# Patient Record
Sex: MALE | Race: WHITE | NOT HISPANIC OR LATINO | Employment: OTHER | URBAN - METROPOLITAN AREA
[De-identification: names, ages, dates, MRNs, and addresses within clinical notes are randomized per-mention and may not be internally consistent; named-entity substitution may affect disease eponyms.]

---

## 2024-01-17 ENCOUNTER — OFFICE VISIT (OUTPATIENT)
Dept: URGENT CARE | Facility: CLINIC | Age: 74
End: 2024-01-17
Payer: MEDICARE

## 2024-01-17 VITALS
WEIGHT: 210 LBS | OXYGEN SATURATION: 96 % | DIASTOLIC BLOOD PRESSURE: 86 MMHG | HEART RATE: 70 BPM | RESPIRATION RATE: 20 BRPM | BODY MASS INDEX: 31.83 KG/M2 | HEIGHT: 68 IN | SYSTOLIC BLOOD PRESSURE: 130 MMHG | TEMPERATURE: 99 F

## 2024-01-17 DIAGNOSIS — R10.9 ABDOMINAL PAIN, UNSPECIFIED ABDOMINAL LOCATION: Primary | ICD-10-CM

## 2024-01-17 DIAGNOSIS — R30.0 DYSURIA: ICD-10-CM

## 2024-01-17 LAB
BILIRUB UR QL STRIP: POSITIVE
GLUCOSE UR QL STRIP: NEGATIVE
KETONES UR QL STRIP: POSITIVE
LEUKOCYTE ESTERASE UR QL STRIP: NEGATIVE
PH, POC UA: 6
POC BLOOD, URINE: NEGATIVE
POC NITRATES, URINE: NEGATIVE
PROT UR QL STRIP: POSITIVE
SP GR UR STRIP: 1.01 (ref 1–1.03)
UROBILINOGEN UR STRIP-ACNC: ABNORMAL (ref 0.3–2.2)

## 2024-01-17 PROCEDURE — 81003 URINALYSIS AUTO W/O SCOPE: CPT | Mod: QW,,, | Performed by: NURSE PRACTITIONER

## 2024-01-17 PROCEDURE — 99203 OFFICE O/P NEW LOW 30 MIN: CPT | Mod: ,,, | Performed by: NURSE PRACTITIONER

## 2024-01-17 RX ORDER — CIPROFLOXACIN 500 MG/1
500 TABLET ORAL EVERY 12 HOURS
Qty: 20 TABLET | Refills: 0 | Status: SHIPPED | OUTPATIENT
Start: 2024-01-17 | End: 2024-01-27

## 2024-01-17 NOTE — PROGRESS NOTES
"Subjective:      Patient ID: Luis Angel Turner is a 73 y.o. male.    Vitals:  height is 5' 8" (1.727 m) and weight is 95.3 kg (210 lb). His oral temperature is 98.7 °F (37.1 °C). His blood pressure is 130/86 and his pulse is 70. His respiration is 20 and oxygen saturation is 96%.     Chief Complaint: pelvic cramping (Pelvic cramping, dark urine, concerned for UTI)    73-year-old male presents with mild dysuria, urinary frequency, urgency and dark colored urine with odor.  Onset a few days ago.  Mild generalized abdominal discomfort without fever, nausea, vomiting or diarrhea.  No recent injury or trauma.      Gastrointestinal:  Negative for abdominal pain, nausea, vomiting, constipation and diarrhea.   Genitourinary:  Positive for dysuria, frequency, urgency and urine decreased. Negative for flank pain.      Objective:     Physical Exam   Constitutional: He is oriented to person, place, and time. He appears well-developed.   HENT:   Head: Normocephalic and atraumatic.   Ears:   Right Ear: External ear normal.   Left Ear: External ear normal.   Nose: Nose normal.   Mouth/Throat: Mucous membranes are normal.   Eyes: Conjunctivae and lids are normal.   Neck: Trachea normal. Neck supple.   Cardiovascular: Normal rate, regular rhythm and normal heart sounds.   Pulmonary/Chest: Effort normal and breath sounds normal. No respiratory distress.   Abdominal: Normal appearance and bowel sounds are normal. He exhibits no distension and no mass. Soft. There is no abdominal tenderness.   Musculoskeletal: Normal range of motion.         General: Normal range of motion.   Neurological: He is alert and oriented to person, place, and time. He has normal strength.   Skin: Skin is warm, dry, intact, not diaphoretic and not pale.   Psychiatric: His speech is normal and behavior is normal. Judgment and thought content normal.   Nursing note and vitals reviewed.      Assessment:     1. Abdominal pain, unspecified abdominal location    2. " Dysuria      Office Visit on 01/17/2024   Component Date Value Ref Range Status    POC Blood, Urine 01/17/2024 Negative  Negative, Positive Slide, Positive Tube Final    POC Bilirubin, Urine 01/17/2024 Positive (A)  Negative, Positive Slide, Positive Tube Final    POC Urobilinogen, Urine 01/17/2024 8 mg/dl  0.3 - 2.2 Final    POC Ketones, Urine 01/17/2024 Positive (A)  Negative, Positive Slide, Positive Tube Final    POC Protein, Urine 01/17/2024 Positive (A)  Negative, Positive Slide, Positive Tube Final    POC Nitrates, Urine 01/17/2024 Negative  Negative, Positive Slide, Positive Tube Final    POC Glucose, Urine 01/17/2024 Negative  Negative, Positive Slide, Positive Tube Final    pH, UA 01/17/2024 6   Final    POC Specific Gravity, Urine 01/17/2024 1.015  1.003 - 1.029 Final    POC Leukocytes, Urine 01/17/2024 Negative  Negative, Positive Slide, Positive Tube Final       Plan:   Maintain adequate hydration.   Take prescription Cipro as directed   Monitor symptoms closely.   Take Tylenol or ibuprofen OTC for fever and pain   Watch out for worsening urinary symptoms, blood in the urine, flank pain, fever, chills, myalgias, and vomiting.    Seek further medical attention immediately at the 1st sign.    Follow-up with your PCP within 72 hours.    We will call you with urine culture results within the next 24-48 hours.     Abdominal pain, unspecified abdominal location  -     POCT Urinalysis, Dipstick, Automated, W/O Scope    Dysuria  -     ciprofloxacin HCl (CIPRO) 500 MG tablet; Take 1 tablet (500 mg total) by mouth every 12 (twelve) hours. for 10 days  Dispense: 20 tablet; Refill: 0  -     Urine culture

## 2024-01-17 NOTE — PATIENT INSTRUCTIONS
Maintain adequate hydration.   Take prescription Cipro as directed   Monitor symptoms closely.   Take Tylenol or ibuprofen OTC for fever and pain   Watch out for worsening urinary symptoms, blood in the urine, flank pain, fever, chills, myalgias, and vomiting.    Seek further medical attention immediately at the 1st sign.    Follow-up with your PCP within 72 hours.    We will call you with urine culture results within the next 24-48 hours.

## 2024-01-19 LAB — BACTERIA UR CULT: NO GROWTH
